# Patient Record
Sex: MALE | Race: WHITE
[De-identification: names, ages, dates, MRNs, and addresses within clinical notes are randomized per-mention and may not be internally consistent; named-entity substitution may affect disease eponyms.]

---

## 2018-04-03 ENCOUNTER — HOSPITAL ENCOUNTER (EMERGENCY)
Dept: HOSPITAL 80 - FED | Age: 78
Discharge: HOME | End: 2018-04-03
Payer: COMMERCIAL

## 2018-04-03 VITALS
RESPIRATION RATE: 18 BRPM | SYSTOLIC BLOOD PRESSURE: 133 MMHG | DIASTOLIC BLOOD PRESSURE: 74 MMHG | HEART RATE: 67 BPM | OXYGEN SATURATION: 96 %

## 2018-04-03 VITALS — TEMPERATURE: 98.2 F

## 2018-04-03 DIAGNOSIS — R06.00: Primary | ICD-10-CM

## 2018-04-03 DIAGNOSIS — Z95.5: ICD-10-CM

## 2018-04-03 DIAGNOSIS — R10.33: ICD-10-CM

## 2018-04-03 DIAGNOSIS — Z79.82: ICD-10-CM

## 2018-04-03 LAB — PLATELET # BLD: 196 10^3/UL (ref 150–400)

## 2018-04-03 NOTE — EDPHY
H & P


Time Seen by Provider: 04/03/18 18:27


HPI/ROS: 





CHIEF COMPLAINT:  Abdominal pain and shortness of breath





HISTORY OF PRESENT ILLNESS:  Patient was on a  vacation in [facility] until 

last week on Wednesday.  After he returned he started having umbilical pain in 

his abdomen which is worse after eating.  He feels like it is indigestion.  

Feels a little bit bloated, but he has had been having 2 soft bowel movements 

today and feels a bit gassy and bloated.  Only previous abdominal surgeries 

hernia.  Not associated with vomiting, does not radiate to the right upper 

quadrant, not associated with fever or chills.





REVIEW OF SYSTEMS:


Eye: no change in vision


ENT: no sore throat


Cardiac: no chest pain or syncope


Pulmonary:  Has also been mildly short of breath over the last 5 days 

especially with exercise including swimming or tenderness.  He thinks it is 

[place] and just recently returned to altitude.

  No cough or sputum production or hemoptysis.


Abdomen:  HPI


Musculoskeletal: no back pain or leg swelling


Skin: no rash


Neuro: no headache


Constitutional: no fever


: no urinary symptoms





A comprehensive 10 point review of systems is otherwise negative aside from 

elements mentioned in the history of present illness.





PAST MEDICAL HISTORY:  Includes cardiac stent and temporal arteritis





Social history:  Recent travel as noted above, nonsmoker





General Appearance: Alert and conversant, cooperative.


Eyes: No scleral  icterus. 


ENT, Mouth: Normal mucous membranes.


Respiratory: Normal respiratory effort, breath sounds equal, lungs are clear to 

auscultation.  Speaks in full sentences, no wheezing


Cardiovascular:  Regular rate and rhythm.


Gastrointestinal:  Abdomen is soft and non tender.  No McBurney's point 

tenderness.  Normal male .


Neurological: Alert, face symmetric, normal motor and sensory in extremities. 


Skin: Warm and dry, no rashes.


Musculoskeletal: No peripheral edema.  No calf tenderness.


Psychiatric: Not agitated.





Emergency Department course/MDM:





Plan for EKG and troponin and D-dimer.  A labs to include CBC chemistry lipase 

and LFT.  Urinalysis.


2039: results discussed, does not have any abdominal pain at this time.  Soft 

and nontender.  CT scanning of the abdomen discussed and patient declined, 

which I think is reasonable.





Think it is unlikely that he has acute coronary syndrome or pulmonary embolism 

or pneumonia.


Smoking Status: Never smoked


Constitutional: 


 Initial Vital Signs











Temperature (C)  36.9 C   04/03/18 17:17


 


Heart Rate  71   04/03/18 17:17


 


Respiratory Rate  18   04/03/18 17:17


 


Blood Pressure  145/80 H  04/03/18 17:17


 


O2 Sat (%)  94   04/03/18 17:17








 











O2 Delivery Mode               Room Air














Allergies/Adverse Reactions: 


 





clopidogrel bisulfate [From Plavix] Allergy (Mild, Verified 04/03/18 17:16)


 Rash








Home Medications: 














 Medication  Instructions  Recorded


 


Aspirin [Aspirin 81mg (*)] 81 mg PO DAILY 08/20/16


 


Atorvastatin Calcium [Lipitor 10 10 mg PO DAILY 08/20/16





mg (*)]  


 


Methotrexate  04/03/18














Medical Decision Making





- Diagnostics


EKG Interpretation: 





12-lead EKG interpreted by me; official reading is in trace master.  My 

interpretation is sinus rhythm with late anterior RS transition, normal 

intervals.


Imaging Results: 


 Imaging Impressions





Chest X-Ray  04/03/18 18:37


Impression:


 


1.  No acute thoracic abnormality. 


2.  COPD/emphysema.


 











Imaging: I viewed and interpreted images myself


Differential Diagnosis: 





Differential diagnosis considered for shortness of breath including but not 

limited to pulmonary infectious process, COPD, asthma, pulmonary embolus and 

congestive heart failure.


Differential diagnosis considered for abdominal pain including but not limited 

to bowel obstruction, appendicitis, cholecystitis, pancreatitis, gastritis and 

urinary tract infection.





- Data Points


Laboratory Results: 


 Laboratory Results





 04/03/18 18:20 





 04/03/18 18:20 





 











  04/03/18 04/03/18 04/03/18





  19:30 18:20 18:20


 


WBC      





    


 


RBC      





    


 


Hgb      





    


 


Hct      





    


 


MCV      





    


 


MCH      





    


 


MCHC      





    


 


RDW      





    


 


Plt Count      





    


 


MPV      





    


 


Neut % (Auto)      





    


 


Lymph % (Auto)      





    


 


Mono % (Auto)      





    


 


Eos % (Auto)      





    


 


Baso % (Auto)      





    


 


Nucleat RBC Rel Count      





    


 


Absolute Neuts (auto)      





    


 


Absolute Lymphs (auto)      





    


 


Absolute Monos (auto)      





    


 


Absolute Eos (auto)      





    


 


Absolute Basos (auto)      





    


 


Absolute Nucleated RBC      





    


 


Immature Gran %      





    


 


Immature Gran #      





    


 


APTT      29.4 SEC SEC





     (23.0-38.0) 


 


D-Dimer      0.35 ug/mLFEU ug/mLFEU





     (0.00-0.50) 


 


Sodium    140 mEq/L mEq/L  





    (135-145)  


 


Potassium    4.2 mEq/L mEq/L  





    (3.5-5.2)  


 


Chloride    102 mEq/L mEq/L  





    ()  


 


Carbon Dioxide    29 mEq/l mEq/l  





    (22-31)  


 


Anion Gap    9 mEq/L mEq/L  





    (8-16)  


 


BUN    17 mg/dL mg/dL  





    (7-23)  


 


Creatinine    0.8 mg/dL mg/dL  





    (0.7-1.3)  


 


Estimated GFR    > 60   





    


 


Glucose    87 mg/dL mg/dL  





    ()  


 


Calcium    8.4 mg/dL L mg/dL  





    (8.5-10.4)  


 


Total Bilirubin    0.6 mg/dL mg/dL  





    (0.1-1.4)  


 


Conjugated Bilirubin    0.2 mg/dL mg/dL  





    (0.0-0.5)  


 


Unconjugated Bilirubin    0.4 mg/dL mg/dL  





    (0.0-1.1)  


 


AST    25 IU/L IU/L  





    (17-59)  


 


ALT    42 IU/L IU/L  





    (21-72)  


 


Alkaline Phosphatase    57 IU/L IU/L  





    ()  


 


Troponin I    < 0.012 ng/mL ng/mL  





    (0.000-0.034)  


 


Total Protein    6.1 g/dL L g/dL  





    (6.3-8.2)  


 


Albumin    3.7 g/dL g/dL  





    (3.5-5.0)  


 


Lipase    135 IU/L IU/L  





    ()  


 


Urine Color  YELLOW     





    


 


Urine Appearance  CLEAR     





    


 


Urine pH  5.0     





   (5.0-7.5)   


 


Ur Specific Gravity  1.012     





   (1.002-1.030)   


 


Urine Protein  NEGATIVE     





   (NEGATIVE)   


 


Urine Ketones  NEGATIVE     





   (NEGATIVE)   


 


Urine Blood  NEGATIVE     





   (NEGATIVE)   


 


Urine Nitrate  NEGATIVE     





   (NEGATIVE)   


 


Urine Bilirubin  NEGATIVE     





   (NEGATIVE)   


 


Urine Urobilinogen  NEGATIVE EU EU    





   (0.2-1.0)   


 


Ur Leukocyte Esterase  NEGATIVE     





   (NEGATIVE)   


 


Urine Glucose  NEGATIVE     





   (NEGATIVE)   














  04/03/18





  18:20


 


WBC  4.93 10^3/uL 10^3/uL





   (3.80-9.50) 


 


RBC  4.43 10^6/uL 10^6/uL





   (4.40-6.38) 


 


Hgb  13.6 g/dL L g/dL





   (13.7-17.5) 


 


Hct  40.7 % %





   (40.0-51.0) 


 


MCV  91.9 fL fL





   (81.5-99.8) 


 


MCH  30.7 pg pg





   (27.9-34.1) 


 


MCHC  33.4 g/dL g/dL





   (32.4-36.7) 


 


RDW  15.0 % %





   (11.5-15.2) 


 


Plt Count  196 10^3/uL 10^3/uL





   (150-400) 


 


MPV  9.6 fL fL





   (8.7-11.7) 


 


Neut % (Auto)  65.6 % %





   (39.3-74.2) 


 


Lymph % (Auto)  17.6 % %





   (15.0-45.0) 


 


Mono % (Auto)  13.8 % H %





   (4.5-13.0) 


 


Eos % (Auto)  1.8 % %





   (0.6-7.6) 


 


Baso % (Auto)  0.8 % %





   (0.3-1.7) 


 


Nucleat RBC Rel Count  0.0 % %





   (0.0-0.2) 


 


Absolute Neuts (auto)  3.23 10^3/uL 10^3/uL





   (1.70-6.50) 


 


Absolute Lymphs (auto)  0.87 10^3/uL L 10^3/uL





   (1.00-3.00) 


 


Absolute Monos (auto)  0.68 10^3/uL 10^3/uL





   (0.30-0.80) 


 


Absolute Eos (auto)  0.09 10^3/uL 10^3/uL





   (0.03-0.40) 


 


Absolute Basos (auto)  0.04 10^3/uL 10^3/uL





   (0.02-0.10) 


 


Absolute Nucleated RBC  0.00 10^3/uL 10^3/uL





   (0-0.01) 


 


Immature Gran %  0.4 % %





   (0.0-1.1) 


 


Immature Gran #  0.02 10^3/uL 10^3/uL





   (0.00-0.10) 


 


APTT  





  


 


D-Dimer  





  


 


Sodium  





  


 


Potassium  





  


 


Chloride  





  


 


Carbon Dioxide  





  


 


Anion Gap  





  


 


BUN  





  


 


Creatinine  





  


 


Estimated GFR  





  


 


Glucose  





  


 


Calcium  





  


 


Total Bilirubin  





  


 


Conjugated Bilirubin  





  


 


Unconjugated Bilirubin  





  


 


AST  





  


 


ALT  





  


 


Alkaline Phosphatase  





  


 


Troponin I  





  


 


Total Protein  





  


 


Albumin  





  


 


Lipase  





  


 


Urine Color  





  


 


Urine Appearance  





  


 


Urine pH  





  


 


Ur Specific Gravity  





  


 


Urine Protein  





  


 


Urine Ketones  





  


 


Urine Blood  





  


 


Urine Nitrate  





  


 


Urine Bilirubin  





  


 


Urine Urobilinogen  





  


 


Ur Leukocyte Esterase  





  


 


Urine Glucose  





  














Departure





- Departure


Disposition: Home, Routine, Self-Care


Clinical Impression: 


Dyspnea


Qualifiers:


 Dyspnea type: unspecified Qualified Code(s): R06.00 - Dyspnea, unspecified





Abdominal pain


Qualifiers:


 Abdominal location: periumbilical Qualified Code(s): R10.33 - Periumbilical 

pain





Condition: Good


Instructions:  Acute Abdominal Pain (ED)


Additional Instructions: 


Urinalysis negative, lipase 135, liver function tests normal.  Normal 

chemistries.  White blood cell count 4.9, hematocrit 40. D-dimer 0.35, normal 

is less than 0.5.  EKG does not show ST elevation or ischemic changes, troponin 

is negative.


Referrals: 


EDY SANDERSON [Other] - As per Instructions

## 2018-04-03 NOTE — CPEKG
Heart Rate: 66

RR Interval: 909

P-R Interval: 196

QRSD Interval: 90

QT Interval: 424

QTC Interval: 445

P Axis: 71

QRS Axis: 73

T Wave Axis: 57

EKG Severity - BORDERLINE ECG -

EKG Impression: SINUS RHYTHM

EKG Impression: BORDERLINE R WAVE PROGRESSION, ANTERIOR LEADS

EKG Impression: BORDERLINE ST ELEVATION, INFERIOR LEADS

Electronically Signed By: Demetrio Ramos 03-Apr-2018 18:48:45